# Patient Record
Sex: FEMALE | HISPANIC OR LATINO | Employment: FULL TIME | URBAN - METROPOLITAN AREA
[De-identification: names, ages, dates, MRNs, and addresses within clinical notes are randomized per-mention and may not be internally consistent; named-entity substitution may affect disease eponyms.]

---

## 2019-06-12 ENCOUNTER — HOSPITAL ENCOUNTER (OUTPATIENT)
Facility: MEDICAL CENTER | Age: 39
End: 2019-06-12
Attending: SPECIALIST
Payer: MEDICAID

## 2019-06-12 ENCOUNTER — HOSPITAL ENCOUNTER (OUTPATIENT)
Dept: LAB | Facility: MEDICAL CENTER | Age: 39
End: 2019-06-12
Attending: SPECIALIST
Payer: COMMERCIAL

## 2019-06-12 PROCEDURE — 87624 HPV HI-RISK TYP POOLED RSLT: CPT

## 2019-06-12 PROCEDURE — 88175 CYTOPATH C/V AUTO FLUID REDO: CPT

## 2019-06-12 PROCEDURE — 87491 CHLMYD TRACH DNA AMP PROBE: CPT

## 2019-06-12 PROCEDURE — 87591 N.GONORRHOEAE DNA AMP PROB: CPT

## 2019-06-16 LAB
C TRACH DNA GENITAL QL NAA+PROBE: NEGATIVE
CYTOLOGY REG CYTOL: NORMAL
HPV HR 12 DNA CVX QL NAA+PROBE: NEGATIVE
HPV16 DNA SPEC QL NAA+PROBE: NEGATIVE
HPV18 DNA SPEC QL NAA+PROBE: NEGATIVE
N GONORRHOEA DNA GENITAL QL NAA+PROBE: NEGATIVE
SPECIMEN SOURCE: NORMAL
SPECIMEN SOURCE: NORMAL

## 2020-08-22 ENCOUNTER — OFFICE VISIT (OUTPATIENT)
Dept: URGENT CARE | Facility: CLINIC | Age: 40
End: 2020-08-22
Payer: COMMERCIAL

## 2020-08-22 ENCOUNTER — APPOINTMENT (OUTPATIENT)
Dept: RADIOLOGY | Facility: IMAGING CENTER | Age: 40
End: 2020-08-22
Attending: PHYSICIAN ASSISTANT
Payer: COMMERCIAL

## 2020-08-22 VITALS
WEIGHT: 180 LBS | HEIGHT: 61 IN | BODY MASS INDEX: 33.99 KG/M2 | HEART RATE: 79 BPM | DIASTOLIC BLOOD PRESSURE: 90 MMHG | TEMPERATURE: 97.9 F | RESPIRATION RATE: 16 BRPM | OXYGEN SATURATION: 97 % | SYSTOLIC BLOOD PRESSURE: 110 MMHG

## 2020-08-22 DIAGNOSIS — M72.2 PLANTAR FASCIITIS, LEFT: ICD-10-CM

## 2020-08-22 DIAGNOSIS — M79.672 PAIN OF LEFT HEEL: ICD-10-CM

## 2020-08-22 PROCEDURE — 73650 X-RAY EXAM OF HEEL: CPT | Mod: TC,LT | Performed by: PHYSICIAN ASSISTANT

## 2020-08-22 PROCEDURE — 99204 OFFICE O/P NEW MOD 45 MIN: CPT | Performed by: PHYSICIAN ASSISTANT

## 2020-08-22 RX ORDER — MELOXICAM 7.5 MG/1
7.5 TABLET ORAL DAILY
Qty: 10 TAB | Refills: 0 | Status: SHIPPED | OUTPATIENT
Start: 2020-08-22 | End: 2020-09-01

## 2020-08-22 ASSESSMENT — ENCOUNTER SYMPTOMS
TINGLING: 0
CHILLS: 0
PALPITATIONS: 0
NAUSEA: 0
VOMITING: 0
BLURRED VISION: 0
SENSORY CHANGE: 0
FEVER: 0
COUGH: 0
BRUISES/BLEEDS EASILY: 0
SORE THROAT: 0
SHORTNESS OF BREATH: 0

## 2020-08-22 NOTE — PROGRESS NOTES
Subjective:      Malena Magallanes Marq is a 39 y.o. female who presents with Foot Pain (left foot, x 3-4 weeks, does not know what happpened, every morning it very sharp pain thorugh out the day )    HPI:  This is a new problem. Malena Magallanes Marq is a 39 y.o. female who presents today for evaluation of left foot/heel pain.  Patient states that she has been having this for approximately 1 month.  Denies any injury.  Has been taking Advil with very mild relief.  States that she did see her PCP who referred her to a podiatrist but the podiatrist is unable to see her till August 31.  She says she feels most the pain on the bottom of her left heel but sometimes the pain radiates up into her posterior distal calf/ankle as well.  She says the pain seems to be worse in the morning when she first wakes up and takes her first few steps.  No numbness or tingling.  No fever/chills.      Review of Systems   Constitutional: Negative for chills and fever.   HENT: Negative for sore throat.    Eyes: Negative for blurred vision.   Respiratory: Negative for cough and shortness of breath.    Cardiovascular: Negative for chest pain and palpitations.   Gastrointestinal: Negative for nausea and vomiting.   Musculoskeletal: Positive for joint pain (left foot pain).   Skin: Negative for rash.   Neurological: Negative for tingling and sensory change.   Endo/Heme/Allergies: Does not bruise/bleed easily.       PMH:  has a past medical history of Anesthesia and Weight gain (7/19/10).  MEDS:   Current Outpatient Medications:   •  phentermine 37.5 MG capsule, Take 37.5 mg by mouth every morning., Disp: , Rfl:   •  hydrochlorothiazide (MICROZIDE) 12.5 MG capsule, Take 12.5 mg by mouth every day., Disp: , Rfl:   ALLERGIES: No Known Allergies  SURGHX:   Past Surgical History:   Procedure Laterality Date   • LARYNGOSCOPY  7/23/2010    Performed by PIERO ENG at [facility]   • TONSILLECTOMY  1998     SOCHX:  reports that  "she has quit smoking. She has never used smokeless tobacco. She reports that she does not drink alcohol or use drugs.  FH: Family history was reviewed, no pertinent findings to report     Objective:     /90   Pulse 79   Temp 36.6 °C (97.9 °F) (Temporal)   Resp 16   Ht 1.549 m (5' 1\")   Wt 81.6 kg (180 lb)   SpO2 97%   BMI 34.01 kg/m²      Physical Exam  Constitutional:       Appearance: She is well-developed.   HENT:      Head: Normocephalic and atraumatic.      Right Ear: External ear normal.      Left Ear: External ear normal.   Eyes:      Conjunctiva/sclera: Conjunctivae normal.      Pupils: Pupils are equal, round, and reactive to light.   Cardiovascular:      Rate and Rhythm: Normal rate and regular rhythm.      Heart sounds: Normal heart sounds. No murmur.   Pulmonary:      Effort: Pulmonary effort is normal.      Breath sounds: Normal breath sounds. No wheezing.   Musculoskeletal:      Left foot: Normal range of motion. Tenderness present. No swelling.        Feet:       Comments: Plantar aspect of left heel is diffusely tender to palpation  No notable swelling.  No overlying erythema or ecchymosis.  No ankle tenderness.  Achilles is nontender.  5/5 strength of lower extremities bilaterally.  DP pulses 2+ and symmetric.  Cap refill less than 2 seconds.  Full ROM of left ankle and left toes.  Antalgic gait.   Skin:     General: Skin is warm and dry.      Capillary Refill: Capillary refill takes less than 2 seconds.   Neurological:      Mental Status: She is alert and oriented to person, place, and time.   Psychiatric:         Behavior: Behavior normal.         Judgment: Judgment normal.         DX-OS CALCIS (HEEL) 2+ LEFT  IMPRESSION:     No fracture or dislocation is seen.     Mild calcaneal spurring.         *X-rays were reviewed and interpreted independently by me. I agree with the radiologist's findings        Assessment/Plan:     1. Pain of left heel  - DX-OS CALCIS (HEEL) 2+ LEFT; Future  - " meloxicam (MOBIC) 7.5 MG Tab; Take 1 Tab by mouth every day for 10 days.  Dispense: 10 Tab; Refill: 0    2. Plantar fasciitis, left  - meloxicam (MOBIC) 7.5 MG Tab; Take 1 Tab by mouth every day for 10 days.  Dispense: 10 Tab; Refill: 0      Symptoms most consistent with plantar fasciitis.  Will try 10 days of meloxicam in place of OTC NSAIDs.  Also alexandra men that she apply ice multiple times per day.  She was given a tall walking boot to wear.  Follow-up with podiatry on 8/31/2020.          Differential Diagnosis, natural history, and supportive care discussed. Return to the Urgent Care or follow up with your PCP if symptoms fail to resolve, or for any new or worsening symptoms. Emergency room precautions discussed. Patient and/or family appears understanding of information.

## 2020-08-23 NOTE — PATIENT INSTRUCTIONS
Plantar Fasciitis    Plantar fasciitis is a painful foot condition that affects the heel. It occurs when the band of tissue that connects the toes to the heel bone (plantar fascia) becomes irritated. This can happen as the result of exercising too much or doing other repetitive activities (overuse injury).  The pain from plantar fasciitis can range from mild irritation to severe pain that makes it difficult to walk or move. The pain is usually worse in the morning after sleeping, or after sitting or lying down for a while. Pain may also be worse after long periods of walking or standing.  What are the causes?  This condition may be caused by:  · Standing for long periods of time.  · Wearing shoes that do not have good arch support.  · Doing activities that put stress on joints (high-impact activities), including running, aerobics, and ballet.  · Being overweight.  · An abnormal way of walking (gait).  · Tight muscles in the back of your lower leg (calf).  · High arches in your feet.  · Starting a new athletic activity.  What are the signs or symptoms?  The main symptom of this condition is heel pain. Pain may:  · Be worse with first steps after a time of rest, especially in the morning after sleeping or after you have been sitting or lying down for a while.  · Be worse after long periods of standing still.  · Decrease after 30-45 minutes of activity, such as gentle walking.  How is this diagnosed?  This condition may be diagnosed based on your medical history and your symptoms. Your health care provider may ask questions about your activity level. Your health care provider will do a physical exam to check for:  · A tender area on the bottom of your foot.  · A high arch in your foot.  · Pain when you move your foot.  · Difficulty moving your foot.  You may have imaging tests to confirm the diagnosis, such as:  · X-rays.  · Ultrasound.  · MRI.  How is this treated?  Treatment for plantar fasciitis depends on how  severe your condition is. Treatment may include:  · Rest, ice, applying pressure (compression), and raising the affected foot (elevation). This may be called RICE therapy. Your health care provider may recommend RICE therapy along with over-the-counter pain medicines to manage your pain.  · Exercises to stretch your calves and your plantar fascia.  · A splint that holds your foot in a stretched, upward position while you sleep (night splint).  · Physical therapy to relieve symptoms and prevent problems in the future.  · Injections of steroid medicine (cortisone) to relieve pain and inflammation.  · Stimulating your plantar fascia with electrical impulses (extracorporeal shock wave therapy). This is usually the last treatment option before surgery.  · Surgery, if other treatments have not worked after 12 months.  Follow these instructions at home:    Managing pain, stiffness, and swelling  · If directed, put ice on the painful area:  ? Put ice in a plastic bag, or use a frozen bottle of water.  ? Place a towel between your skin and the bag or bottle.  ? Roll the bottom of your foot over the bag or bottle.  ? Do this for 20 minutes, 2-3 times a day.  · Wear athletic shoes that have air-sole or gel-sole cushions, or try wearing soft shoe inserts that are designed for plantar fasciitis.  · Raise (elevate) your foot above the level of your heart while you are sitting or lying down.  Activity  · Avoid activities that cause pain. Ask your health care provider what activities are safe for you.  · Do physical therapy exercises and stretches as told by your health care provider.  · Try activities and forms of exercise that are easier on your joints (low-impact). Examples include swimming, water aerobics, and biking.  General instructions  · Take over-the-counter and prescription medicines only as told by your health care provider.  · Wear a night splint while sleeping, if told by your health care provider. Loosen the splint  if your toes tingle, become numb, or turn cold and blue.  · Maintain a healthy weight, or work with your health care provider to lose weight as needed.  · Keep all follow-up visits as told by your health care provider. This is important.  Contact a health care provider if you:  · Have symptoms that do not go away after caring for yourself at home.  · Have pain that gets worse.  · Have pain that affects your ability to move or do your daily activities.  Summary  · Plantar fasciitis is a painful foot condition that affects the heel. It occurs when the band of tissue that connects the toes to the heel bone (plantar fascia) becomes irritated.  · The main symptom of this condition is heel pain that may be worse after exercising too much or standing still for a long time.  · Treatment varies, but it usually starts with rest, ice, compression, and elevation (RICE therapy) and over-the-counter medicines to manage pain.  This information is not intended to replace advice given to you by your health care provider. Make sure you discuss any questions you have with your health care provider.  Document Released: 09/12/2002 Document Revised: 11/30/2018 Document Reviewed: 10/15/2018  [facility] Patient Education © 2020 [employer]

## 2020-08-25 ENCOUNTER — TELEPHONE (OUTPATIENT)
Dept: URGENT CARE | Facility: CLINIC | Age: 40
End: 2020-08-25

## 2020-08-25 NOTE — TELEPHONE ENCOUNTER
Pt called to see if you would write her a note for work. She is asking if it can work restrictions on it.

## 2020-08-27 ENCOUNTER — TELEPHONE (OUTPATIENT)
Dept: URGENT CARE | Facility: CLINIC | Age: 40
End: 2020-08-27

## 2020-08-27 NOTE — TELEPHONE ENCOUNTER
2nd attempt to call patient to let her know about work note she has requested, she did note answer, no identifier, left vm to call back.   * work note cannot be provided with restrictions she needs to go to PCP.

## 2023-02-18 ENCOUNTER — HOSPITAL ENCOUNTER (OUTPATIENT)
Dept: RADIOLOGY | Facility: MEDICAL CENTER | Age: 43
End: 2023-02-18
Attending: OPHTHALMOLOGY
Payer: COMMERCIAL

## 2023-02-18 DIAGNOSIS — H30.22 POSTERIOR CYCLITIS OF LEFT EYE: ICD-10-CM

## 2023-02-18 PROCEDURE — 71046 X-RAY EXAM CHEST 2 VIEWS: CPT

## 2023-03-19 ENCOUNTER — APPOINTMENT (OUTPATIENT)
Dept: RADIOLOGY | Facility: MEDICAL CENTER | Age: 43
End: 2023-03-19
Attending: OPHTHALMOLOGY
Payer: COMMERCIAL

## 2023-04-09 ENCOUNTER — HOSPITAL ENCOUNTER (OUTPATIENT)
Dept: RADIOLOGY | Facility: MEDICAL CENTER | Age: 43
End: 2023-04-09
Attending: OPHTHALMOLOGY
Payer: COMMERCIAL

## 2023-04-09 DIAGNOSIS — H46.9 ON (OPTIC NEURITIS): ICD-10-CM

## 2023-04-09 PROCEDURE — 70553 MRI BRAIN STEM W/O & W/DYE: CPT

## 2023-04-09 PROCEDURE — A9579 GAD-BASE MR CONTRAST NOS,1ML: HCPCS

## 2023-04-09 PROCEDURE — 700117 HCHG RX CONTRAST REV CODE 255

## 2023-04-09 PROCEDURE — 70543 MRI ORBT/FAC/NCK W/O &W/DYE: CPT

## 2023-04-09 RX ADMIN — GADOTERIDOL 20 ML: 279.3 INJECTION, SOLUTION INTRAVENOUS at 14:17
